# Patient Record
Sex: MALE | Race: WHITE | Employment: FULL TIME | URBAN - METROPOLITAN AREA
[De-identification: names, ages, dates, MRNs, and addresses within clinical notes are randomized per-mention and may not be internally consistent; named-entity substitution may affect disease eponyms.]

---

## 2018-09-11 ENCOUNTER — HOSPITAL ENCOUNTER (EMERGENCY)
Age: 43
Discharge: HOME | End: 2018-09-11
Attending: EMERGENCY MEDICINE
Payer: COMMERCIAL

## 2018-09-11 ENCOUNTER — APPOINTMENT (OUTPATIENT)
Dept: GENERAL RADIOLOGY | Age: 43
End: 2018-09-11
Payer: COMMERCIAL

## 2018-09-11 VITALS
RESPIRATION RATE: 18 BRPM | DIASTOLIC BLOOD PRESSURE: 73 MMHG | SYSTOLIC BLOOD PRESSURE: 122 MMHG | WEIGHT: 284 LBS | HEIGHT: 73 IN | HEART RATE: 75 BPM | BODY MASS INDEX: 37.64 KG/M2 | OXYGEN SATURATION: 93 % | TEMPERATURE: 98.5 F

## 2018-09-11 DIAGNOSIS — R19.7 DIARRHEA, UNSPECIFIED TYPE: ICD-10-CM

## 2018-09-11 DIAGNOSIS — K52.9 GASTROENTERITIS: Primary | ICD-10-CM

## 2018-09-11 LAB
ABSOLUTE EOS #: 0.2 K/UL (ref 0–0.4)
ABSOLUTE IMMATURE GRANULOCYTE: ABNORMAL K/UL (ref 0–0.3)
ABSOLUTE LYMPH #: 4.5 K/UL (ref 1–4.8)
ABSOLUTE MONO #: 1.1 K/UL (ref 0.2–0.8)
ALBUMIN SERPL-MCNC: 4.3 G/DL (ref 3.5–5.2)
ALBUMIN/GLOBULIN RATIO: NORMAL (ref 1–2.5)
ALP BLD-CCNC: 52 U/L (ref 40–129)
ALT SERPL-CCNC: 19 U/L (ref 5–41)
AMYLASE: 56 U/L (ref 28–100)
ANION GAP SERPL CALCULATED.3IONS-SCNC: 14 MMOL/L (ref 9–17)
AST SERPL-CCNC: 18 U/L
BASOPHILS # BLD: 1 % (ref 0–2)
BASOPHILS ABSOLUTE: 0.1 K/UL (ref 0–0.2)
BILIRUB SERPL-MCNC: 0.35 MG/DL (ref 0.3–1.2)
BILIRUBIN DIRECT: 0.1 MG/DL
BILIRUBIN, INDIRECT: 0.25 MG/DL (ref 0–1)
BUN BLDV-MCNC: 10 MG/DL (ref 6–20)
BUN/CREAT BLD: 15 (ref 9–20)
CALCIUM SERPL-MCNC: 9.3 MG/DL (ref 8.6–10.4)
CHLORIDE BLD-SCNC: 103 MMOL/L (ref 98–107)
CO2: 24 MMOL/L (ref 20–31)
CREAT SERPL-MCNC: 0.68 MG/DL (ref 0.7–1.2)
DIFFERENTIAL TYPE: ABNORMAL
EOSINOPHILS RELATIVE PERCENT: 1 % (ref 1–4)
GFR AFRICAN AMERICAN: >60 ML/MIN
GFR NON-AFRICAN AMERICAN: >60 ML/MIN
GFR SERPL CREATININE-BSD FRML MDRD: ABNORMAL ML/MIN/{1.73_M2}
GFR SERPL CREATININE-BSD FRML MDRD: ABNORMAL ML/MIN/{1.73_M2}
GLOBULIN: NORMAL G/DL (ref 1.5–3.8)
GLUCOSE BLD-MCNC: 98 MG/DL (ref 70–99)
HCT VFR BLD CALC: 48.8 % (ref 41–53)
HEMOGLOBIN: 16.4 G/DL (ref 13.5–17.5)
IMMATURE GRANULOCYTES: ABNORMAL %
LIPASE: 29 U/L (ref 13–60)
LYMPHOCYTES # BLD: 32 % (ref 24–44)
MCH RBC QN AUTO: 31.7 PG (ref 26–34)
MCHC RBC AUTO-ENTMCNC: 33.6 G/DL (ref 31–37)
MCV RBC AUTO: 94.3 FL (ref 80–100)
MONOCYTES # BLD: 8 % (ref 1–7)
NRBC AUTOMATED: ABNORMAL PER 100 WBC
PDW BLD-RTO: 14 % (ref 11.5–14.5)
PLATELET # BLD: 296 K/UL (ref 130–400)
PLATELET ESTIMATE: ABNORMAL
PMV BLD AUTO: 8.5 FL (ref 6–12)
POTASSIUM SERPL-SCNC: 4 MMOL/L (ref 3.7–5.3)
RBC # BLD: 5.18 M/UL (ref 4.5–5.9)
RBC # BLD: ABNORMAL 10*6/UL
SEG NEUTROPHILS: 58 % (ref 36–66)
SEGMENTED NEUTROPHILS ABSOLUTE COUNT: 8.3 K/UL (ref 1.8–7.7)
SODIUM BLD-SCNC: 141 MMOL/L (ref 135–144)
TOTAL PROTEIN: 7.2 G/DL (ref 6.4–8.3)
WBC # BLD: 14.1 K/UL (ref 3.5–11)
WBC # BLD: ABNORMAL 10*3/UL

## 2018-09-11 PROCEDURE — 82272 OCCULT BLD FECES 1-3 TESTS: CPT

## 2018-09-11 PROCEDURE — 87425 ROTAVIRUS AG IA: CPT

## 2018-09-11 PROCEDURE — 6360000002 HC RX W HCPCS: Performed by: EMERGENCY MEDICINE

## 2018-09-11 PROCEDURE — 96375 TX/PRO/DX INJ NEW DRUG ADDON: CPT

## 2018-09-11 PROCEDURE — 87329 GIARDIA AG IA: CPT

## 2018-09-11 PROCEDURE — 82150 ASSAY OF AMYLASE: CPT

## 2018-09-11 PROCEDURE — 85025 COMPLETE CBC W/AUTO DIFF WBC: CPT

## 2018-09-11 PROCEDURE — 87493 C DIFF AMPLIFIED PROBE: CPT

## 2018-09-11 PROCEDURE — 74022 RADEX COMPL AQT ABD SERIES: CPT

## 2018-09-11 PROCEDURE — 80048 BASIC METABOLIC PNL TOTAL CA: CPT

## 2018-09-11 PROCEDURE — 96374 THER/PROPH/DIAG INJ IV PUSH: CPT

## 2018-09-11 PROCEDURE — C9113 INJ PANTOPRAZOLE SODIUM, VIA: HCPCS | Performed by: EMERGENCY MEDICINE

## 2018-09-11 PROCEDURE — 83690 ASSAY OF LIPASE: CPT

## 2018-09-11 PROCEDURE — 80076 HEPATIC FUNCTION PANEL: CPT

## 2018-09-11 PROCEDURE — 36415 COLL VENOUS BLD VENIPUNCTURE: CPT

## 2018-09-11 PROCEDURE — 99284 EMERGENCY DEPT VISIT MOD MDM: CPT

## 2018-09-11 PROCEDURE — 2580000003 HC RX 258: Performed by: EMERGENCY MEDICINE

## 2018-09-11 PROCEDURE — 87505 NFCT AGENT DETECTION GI: CPT

## 2018-09-11 RX ORDER — 0.9 % SODIUM CHLORIDE 0.9 %
10 VIAL (ML) INJECTION ONCE
Status: COMPLETED | OUTPATIENT
Start: 2018-09-11 | End: 2018-09-11

## 2018-09-11 RX ORDER — ONDANSETRON 4 MG/1
4 TABLET, ORALLY DISINTEGRATING ORAL EVERY 8 HOURS PRN
Qty: 20 TABLET | Refills: 0 | Status: SHIPPED | OUTPATIENT
Start: 2018-09-11 | End: 2018-10-30

## 2018-09-11 RX ORDER — 0.9 % SODIUM CHLORIDE 0.9 %
1000 INTRAVENOUS SOLUTION INTRAVENOUS ONCE
Status: COMPLETED | OUTPATIENT
Start: 2018-09-11 | End: 2018-09-11

## 2018-09-11 RX ORDER — PANTOPRAZOLE SODIUM 40 MG/10ML
40 INJECTION, POWDER, LYOPHILIZED, FOR SOLUTION INTRAVENOUS ONCE
Status: COMPLETED | OUTPATIENT
Start: 2018-09-11 | End: 2018-09-11

## 2018-09-11 RX ORDER — ONDANSETRON 2 MG/ML
4 INJECTION INTRAMUSCULAR; INTRAVENOUS ONCE
Status: COMPLETED | OUTPATIENT
Start: 2018-09-11 | End: 2018-09-11

## 2018-09-11 RX ORDER — DIPHENOXYLATE HYDROCHLORIDE AND ATROPINE SULFATE 2.5; .025 MG/1; MG/1
1 TABLET ORAL 4 TIMES DAILY PRN
Qty: 12 TABLET | Refills: 0 | Status: SHIPPED | OUTPATIENT
Start: 2018-09-11 | End: 2018-09-16

## 2018-09-11 RX ORDER — DICYCLOMINE HYDROCHLORIDE 10 MG/1
10 CAPSULE ORAL EVERY 6 HOURS PRN
Qty: 20 CAPSULE | Refills: 0 | Status: SHIPPED | OUTPATIENT
Start: 2018-09-11 | End: 2018-10-30

## 2018-09-11 RX ADMIN — SODIUM CHLORIDE 1000 ML: 9 INJECTION, SOLUTION INTRAVENOUS at 20:37

## 2018-09-11 RX ADMIN — ONDANSETRON 4 MG: 2 INJECTION INTRAMUSCULAR; INTRAVENOUS at 20:37

## 2018-09-11 RX ADMIN — PANTOPRAZOLE SODIUM 40 MG: 40 INJECTION, POWDER, FOR SOLUTION INTRAVENOUS at 20:38

## 2018-09-11 RX ADMIN — Medication 10 ML: at 20:38

## 2018-09-11 ASSESSMENT — PAIN DESCRIPTION - FREQUENCY: FREQUENCY: INTERMITTENT

## 2018-09-11 ASSESSMENT — ENCOUNTER SYMPTOMS
ABDOMINAL DISTENTION: 1
DIARRHEA: 1
NAUSEA: 1
BLOOD IN STOOL: 0
ABDOMINAL PAIN: 1
RESPIRATORY NEGATIVE: 1

## 2018-09-11 ASSESSMENT — PAIN SCALES - GENERAL: PAINLEVEL_OUTOF10: 10

## 2018-09-11 ASSESSMENT — PAIN DESCRIPTION - LOCATION: LOCATION: ABDOMEN

## 2018-09-11 ASSESSMENT — PAIN DESCRIPTION - PROGRESSION: CLINICAL_PROGRESSION: NOT CHANGED

## 2018-09-11 ASSESSMENT — PAIN DESCRIPTION - PAIN TYPE: TYPE: ACUTE PAIN

## 2018-09-11 ASSESSMENT — PAIN DESCRIPTION - DESCRIPTORS: DESCRIPTORS: SHARP;CRAMPING

## 2018-09-12 LAB
C DIFFICILE TOXINS, PCR: ABNORMAL
CAMPYLOBACTER PCR: NORMAL
DIRECT EXAM: NORMAL
Lab: NORMAL
Lab: NORMAL
SALMONELLA PCR: NORMAL
SHIGATOXIN GENE PCR: NORMAL
SHIGELLA SP PCR: NORMAL
SPECIMEN DESCRIPTION: ABNORMAL
SPECIMEN DESCRIPTION: NORMAL
SPECIMEN DESCRIPTION: NORMAL
SPECIMEN: NORMAL
STATUS: NORMAL
STATUS: NORMAL

## 2018-09-12 NOTE — ED PROVIDER NOTES
[address] [place] [address] ED  eMERGENCY dEPARTMENT eNCOUnter      Pt Name: Blake Dutta  MRN: [id number]  [place] 1975  Date of evaluation: 9/11/2018  Provider: Meir Javier MD    CHIEF COMPLAINT       Chief Complaint   Patient presents with    Abdominal Pain     onset sunday    Diarrhea       HISTORY OF PRESENT ILLNESS  (Location/Symptom, Timing/Onset, Context/Setting, Quality, Duration, Modifying Factors, Severity.)   Blake Dutta is a 44 y.o. male who presents to the emergency department For evaluation of abdominal pain associated with nausea and diarrhea. Patient states his symptoms began about 2 days ago. He feels the symptoms may be related to eating [employer]. He has had paroxysmal episodes of yellow watery diarrhea. He denies melena hematemesis or coffee-ground emesis. He did miss work last night and tonight due to his symptoms. He has a diffuse crampy abdominal discomfort. Nursing Notes were reviewed. ALLERGIES     Pcn [penicillins]    CURRENT MEDICATIONS       Previous Medications    No medications on file       PAST MEDICAL HISTORY         Diagnosis Date    Kidney stone    DM  Obesity  Lymphocytosis    SURGICAL HISTORY           Procedure Laterality Date    HERNIA REPAIR      SHOULDER SURGERY           FAMILY HISTORY     History reviewed. No pertinent family history. No family status information on file. SOCIAL HISTORY      reports that he has been smoking. He has never used smokeless tobacco. He reports that he drinks alcohol. He reports that he does not use drugs. REVIEW OF SYSTEMS    (2-9 systems for level 4, 10 or more for level 5)     Review of Systems   HENT: Negative. Respiratory: Negative. Cardiovascular: Negative. Gastrointestinal: Positive for abdominal distention, abdominal pain, diarrhea and nausea. Negative for blood in stool. Genitourinary: Negative. Musculoskeletal: Negative. Skin: Negative.     Neurological: Positive for weakness. Hematological: Negative. Except as noted above the remainder of the review of systems was reviewed and negative. PHYSICAL EXAM    (up to 7 for level 4, 8 or more for level 5)     Vitals:    09/11/18 1936   BP: 122/73   Pulse: 75   Resp: 18   Temp: 98.5 °F (36.9 °C)   TempSrc: Oral   SpO2: 93%   Weight: 284 lb (128.8 kg)   Height: 6' 1\" (1.854 m)       Physical exam reflects a moderately obese male. He is afebrile with stable vital signs to include pulse ox of 93% on room air. He is not hypoxic. He is alert conversive and appropriate in behavior. He is not in distress. Oral pharyngeal exam is without lesion. Heart regular rate and rhythm normal S1-S2 no murmurs rubs gallops. Lungs are clear to auscultation without wheezes rales or rhonchi. Abdomen is distended. No focal pain rebound or guarding is a diffuse crampy discomfort. Midline incision noted consistent with his previous umbilical herniorrhaphy. He has slight tenderness in this area. Bowel sounds are appreciated. Extremities show no gross abnormality. Integument without rash or lesion. No neurovascular deficits are noted.       DIAGNOSTIC RESULTS         RADIOLOGY:   Non-plain film images such as CT, Ultrasound and MRI are read by the radiologist. Annalise Dooley radiographic images are visualized and preliminarily interpreted by the emergency physician with the below findings:    XR Acute Abd Series Chest 1 VW   Status: Final result   Order Providers     Authorizing Billing   MD Thanh Keating MD          Signed by     Signed Date/Time  Phone Pager   [address] [place] [address] 9/11/2018 21:32 [phone]    [facility]     Read Date Phone Pager   [address] [place] [address] Sep 11, 2018 [phone]    Radiation Dose Estimates     No radiation information found for this patient   Narrative   EXAMINATION:   TWO XRAY VIEWS OF THE ABDOMEN AND SINGLE  XRAY VIEW OF THE CHEST       9/11/2018 8:14 pm       COMPARISON:   None.     limits   CULTURE STOOL   C DIFF TOXIN B BY RT PCR   GIARDIA ANTIGEN   AMYLASE   HEPATIC FUNCTION PANEL   LIPASE   BLOOD OCCULT STOOL SCREEN #1   ROTAVIRUS ANTIGEN, STOOL     Results for Kasia Roman (MRN [id number]) as of 9/11/2018 21:00   Ref.  Range 9/11/2018 20:15 9/11/2018 20:37   Sodium Latest Ref Range: 135 - 144 mmol/L 141    Potassium Latest Ref Range: 3.7 - 5.3 mmol/L 4.0    Chloride Latest Ref Range: 98 - 107 mmol/L 103    CO2 Latest Ref Range: 20 - 31 mmol/L 24    BUN Latest Ref Range: 6 - 20 mg/dL 10    Creatinine Latest Ref Range: 0.70 - 1.20 mg/dL 0.68 (L)    Bun/Cre Ratio Latest Ref Range: 9 - 20  15    Anion Gap Latest Ref Range: 9 - 17 mmol/L 14    GFR Non- Latest Ref Range: >60 mL/min >60    GFR African American Latest Ref Range: >60 mL/min >60    Glucose Latest Ref Range: 70 - 99 mg/dL 98    Calcium Latest Ref Range: 8.6 - 10.4 mg/dL 9.3    Albumin/Globulin Ratio Latest Ref Range: 1.0 - 2.5  NOT REPORTED    Total Protein Latest Ref Range: 6.4 - 8.3 g/dL 7.2    GFR Comment Unknown Pend    GFR Staging Unknown NOT REPORTED    Albumin Latest Ref Range: 3.5 - 5.2 g/dL 4.3    Globulin Latest Ref Range: 1.5 - 3.8 g/dL NOT REPORTED    Alk Phos Latest Ref Range: 40 - 129 U/L 52    ALT Latest Ref Range: 5 - 41 U/L 19    Amylase Latest Ref Range: 28 - 100 U/L 56    AST Latest Ref Range: <40 U/L 18    Bilirubin Latest Ref Range: 0.3 - 1.2 mg/dL 0.35    Bilirubin, Direct Latest Ref Range: <0.31 mg/dL 0.10    Bilirubin, Indirect Latest Ref Range: 0.00 - 1.00 mg/dL 0.25    Lipase Latest Ref Range: 13 - 60 U/L 29    WBC Latest Ref Range: 3.5 - 11.0 k/uL 14.1 (H)    RBC Latest Ref Range: 4.5 - 5.9 m/uL 5.18    Hemoglobin Quant Latest Ref Range: 13.5 - 17.5 g/dL 16.4    Hematocrit Latest Ref Range: 41 - 53 % 48.8    MCV Latest Ref Range: 80 - 100 fL 94.3    MCH Latest Ref Range: 26 - 34 pg 31.7    MCHC Latest Ref Range: 31 - 37 g/dL 33.6    MPV Latest Ref Range: 6.0 - 12.0 fL 8.5    RDW Latest [id number]  [phone]    Schedule an appointment as soon as possible for a visit   If symptoms worsen    [facility] ED  [address] [facility]  [phone]    As needed, If symptoms worsen      DISCHARGE MEDICATIONS:     New Prescriptions    DICYCLOMINE (BENTYL) 10 MG CAPSULE    Take 1 capsule by mouth every 6 hours as needed (cramps)    DIPHENOXYLATE-ATROPINE (LOMOTIL) 2.5-0.025 MG PER TABLET    Take 1 tablet by mouth 4 times daily as needed for Diarrhea for up to 5 days. Meagher Rotunda     ONDANSETRON (ZOFRAN ODT) 4 MG DISINTEGRATING TABLET    Take 1 tablet by mouth every 8 hours as needed for Nausea           (Please note that portions of this note were completed with a voice recognition program.  Efforts were made to edit the dictations but occasionally words are mis-transcribed.)    Jose Luis Franco MD  Attending Emergency Physician         Jose Luis Franco MD  09/11/18 [id number]

## 2018-09-12 NOTE — ED NOTES
Pt presents to ED ambulatory with steady gait c/o of mid to upper abd pain and diarrhea since Sunday. Pt rates pain 10/10. Pt states eating [employer] and then having the pain. Pt denies n/v. Pt denies chest pain or SOB. Pt denies fever or chills. Bowel sounds active x4 quad. ABd is soft and non-tender to palpation. Skin is pink, warm, and dry.       Severa Current, RN  09/11/18 2043

## 2018-09-13 LAB
DATE, STOOL #1: NORMAL
DATE, STOOL #2: NORMAL
DATE, STOOL #3: NORMAL
HEMOCCULT SP1 STL QL: NEGATIVE
HEMOCCULT SP2 STL QL: NORMAL
HEMOCCULT SP3 STL QL: NORMAL
TIME, STOOL #1: NORMAL
TIME, STOOL #2: NORMAL
TIME, STOOL #3: NORMAL

## 2018-09-14 NOTE — ED NOTES
Rx for Flagyl 500mg TID #30 phoned to [address] [place] [address] Drug [phone]. Alon Martino [facility]  09/14/18 [id number]

## 2018-10-30 ENCOUNTER — OFFICE VISIT (OUTPATIENT)
Dept: FAMILY MEDICINE CLINIC | Age: 43
End: 2018-10-30
Payer: COMMERCIAL

## 2018-10-30 VITALS
TEMPERATURE: 97.9 F | WEIGHT: 287 LBS | SYSTOLIC BLOOD PRESSURE: 126 MMHG | OXYGEN SATURATION: 93 % | BODY MASS INDEX: 38.04 KG/M2 | HEIGHT: 73 IN | HEART RATE: 70 BPM | DIASTOLIC BLOOD PRESSURE: 86 MMHG

## 2018-10-30 DIAGNOSIS — Z23 NEED FOR DIPHTHERIA-TETANUS-PERTUSSIS (TDAP) VACCINE: ICD-10-CM

## 2018-10-30 DIAGNOSIS — F17.200 TOBACCO DEPENDENCE: ICD-10-CM

## 2018-10-30 DIAGNOSIS — Z82.49 FAMILY HISTORY OF EARLY CAD: ICD-10-CM

## 2018-10-30 DIAGNOSIS — Z99.89 OSA ON CPAP: ICD-10-CM

## 2018-10-30 DIAGNOSIS — G47.33 OSA ON CPAP: ICD-10-CM

## 2018-10-30 DIAGNOSIS — M50.20 CERVICAL HERNIATED DISC: Primary | ICD-10-CM

## 2018-10-30 DIAGNOSIS — R07.9 RIGHT-SIDED CHEST PAIN: ICD-10-CM

## 2018-10-30 DIAGNOSIS — M25.511 ACUTE PAIN OF RIGHT SHOULDER: ICD-10-CM

## 2018-10-30 DIAGNOSIS — M54.2 NECK PAIN: ICD-10-CM

## 2018-10-30 PROCEDURE — 99204 OFFICE O/P NEW MOD 45 MIN: CPT | Performed by: NURSE PRACTITIONER

## 2018-10-30 PROCEDURE — 93000 ELECTROCARDIOGRAM COMPLETE: CPT | Performed by: NURSE PRACTITIONER

## 2018-10-30 PROCEDURE — 90471 IMMUNIZATION ADMIN: CPT | Performed by: NURSE PRACTITIONER

## 2018-10-30 PROCEDURE — 90715 TDAP VACCINE 7 YRS/> IM: CPT | Performed by: NURSE PRACTITIONER

## 2018-10-30 RX ORDER — TIZANIDINE 4 MG/1
4 TABLET ORAL 3 TIMES DAILY
Qty: 30 TABLET | Refills: 0 | Status: SHIPPED | OUTPATIENT
Start: 2018-10-30 | End: 2018-11-09

## 2018-10-30 ASSESSMENT — ENCOUNTER SYMPTOMS
VOMITING: 0
VISUAL CHANGE: 0
PHOTOPHOBIA: 0
CHEST TIGHTNESS: 0
COUGH: 0
ABDOMINAL PAIN: 0
TROUBLE SWALLOWING: 0
COLOR CHANGE: 0
SHORTNESS OF BREATH: 0
WHEEZING: 0
NAUSEA: 0

## 2018-10-30 ASSESSMENT — PATIENT HEALTH QUESTIONNAIRE - PHQ9
1. LITTLE INTEREST OR PLEASURE IN DOING THINGS: 0
SUM OF ALL RESPONSES TO PHQ9 QUESTIONS 1 & 2: 0
SUM OF ALL RESPONSES TO PHQ QUESTIONS 1-9: 0
SUM OF ALL RESPONSES TO PHQ QUESTIONS 1-9: 0
2. FEELING DOWN, DEPRESSED OR HOPELESS: 0

## 2018-10-30 NOTE — PROGRESS NOTES
[address]  [place], [address]  [phone]      Divine Garcia is a 44 y.o. male who presents today for his  medicalconditions/complaints as noted below. Jasbir Langston is c/o of New Patient (declines flu vaccine) and Neck Pain (started a 1-1/2 months ago,worse since last Friday,ice made it worse,tried heat,icy hot helps for a couple hrs,unable to completely turn head on right,massage last wk)  . HPI:   Had prev. Right shoulder surgery and feels pain into right shoulder. He had PT ordered per prev. pcp and has appt this Thursday to trial. He also has routine blood work schd. For this saturday from labs ordered by dr Chaya Parham. Also has sleep apnea, dx'd > 5 years ago and needs new order for cpap machine and supplies, he has not been able to wear this for > 3 months d/t being broke. He did have MRi of neck and right shoulder in 2017 and was never sent to any specialists. He has strong fm hx of heart problems and concerned about right sided chest pain. Does have mild SOB at times. Neck Pain    This is a new problem. The current episode started more than 1 month ago (last 2 months). The problem occurs constantly. The problem has been gradually worsening. The pain is associated with nothing. The pain is present in the right side, left side and midline. The quality of the pain is described as aching, burning and shooting. The pain is moderate. The symptoms are aggravated by position and twisting. The pain is same all the time. Associated symptoms include chest pain (right side) and tingling. Pertinent negatives include no fever, headaches, leg pain, numbness, pain with swallowing, photophobia, syncope, trouble swallowing, visual change, weakness or weight loss. He has tried NSAIDs, ice, neck support, home exercises, heat, acetaminophen and bed rest for the symptoms. The treatment provided no relief.        Past Medical History:   Diagnosis Date will await for final rad report    Xray right shoulder reviewed today and bone chip noted in ac joint, unsure if r/t to old surgery, no acute changes   will await for final rad report    Ekg:  No acute change, possible LVH or pulmonary disease  Plan:      Return if symptoms worsen or fail to improve.   Orders Placed This Encounter   Procedures    XR CERVICAL SPINE (2-3 VIEWS)     Order Specific Question:   Reason for exam:     Answer:   pain    XR SHOULDER RIGHT (MIN 2 VIEWS)     Order Specific Question:   Reason for exam:     Answer:   pain, hx of surgery    Tdap (age 6y and older) IM ([address])   [address] [facility]- Fredrick Marroquin MD     Referral Priority:   Urgent     Referral Type:   Eval and Treat     Referral Reason:   Specialty Services Required     Referred to Provider:   Carmina Mandujano MD     Requested Specialty:   Pain Management     Number of Visits Requested:   1    (Gxt) Stress Test Exercise W Out Myoview     Standing Status:   Future     Standing Expiration Date:   10/30/2019     Order Specific Question:   Reason for Exam?     Answer:   EKG abnormalities    EKG 12 Lead     Order Specific Question:   Reason for Exam?     Answer:   Chest pain     Orders Placed This Encounter   Medications    tiZANidine (ZANAFLEX) 4 MG tablet     Sig: Take 1 tablet by mouth 3 times daily for 10 days     Dispense:  30 tablet     Refill:  0   neck/shoulder:  Continue with plan to trial formal PT  He has failed conservative tx ( nsaids, ice, heat, topicals, massage, percocet from prev. pcp)  F/u with pain mgmt for possible injections for herniated disk   Trial muscle relaxers    Pt to get our office copy of labs when complete  Hard rx given for new cpap and supplies      Cardio:  Check stress test with abnormal ekg, smoking hx and fm hx  Will call with test results  Stop smoking!!!    Pt not ready to quit smoking at this time and will change if he and when he is ready, all cessation options discussed    tdap updated, he declines other vaccines    * 45 mins spent with pt discussing mx health problems and counseling on tx options    Patient given educational materials - see patient instructions. Discussed use,benefit, and side effects of prescribed medications. All patient questions answered. Pt voiced understanding. Reviewed health maintenance. Instructed to continue currentmedications, diet and exercise.     Electronically signed by Kaycee Garcia CNP on 10/30/2018 at 12:54 PM

## 2018-11-05 ENCOUNTER — TELEPHONE (OUTPATIENT)
Dept: FAMILY MEDICINE CLINIC | Age: 43
End: 2018-11-05

## 2018-11-14 ENCOUNTER — TELEPHONE (OUTPATIENT)
Dept: FAMILY MEDICINE CLINIC | Age: 43
End: 2018-11-14

## 2018-11-15 ENCOUNTER — TELEPHONE (OUTPATIENT)
Dept: FAMILY MEDICINE CLINIC | Age: 43
End: 2018-11-15

## 2018-11-15 NOTE — TELEPHONE ENCOUNTER
I called patient and he stated he was at [facility] and told them the pressure was 11.

## 2019-07-23 ENCOUNTER — OFFICE VISIT (OUTPATIENT)
Dept: FAMILY MEDICINE CLINIC | Age: 44
End: 2019-07-23
Payer: COMMERCIAL

## 2019-07-23 VITALS
RESPIRATION RATE: 18 BRPM | HEART RATE: 75 BPM | TEMPERATURE: 98.6 F | WEIGHT: 294 LBS | DIASTOLIC BLOOD PRESSURE: 82 MMHG | BODY MASS INDEX: 38.79 KG/M2 | SYSTOLIC BLOOD PRESSURE: 138 MMHG | OXYGEN SATURATION: 94 %
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
## 2019-07-23 PROCEDURE — 99213 OFFICE O/P EST LOW 20 MIN: CPT | Performed by: NURSE PRACTITIONER

## 2019-07-23 RX ORDER — SULFAMETHOXAZOLE AND TRIMETHOPRIM 800; 160 MG/1; MG/1
2 TABLET ORAL 2 TIMES DAILY
Qty: 28 TABLET | Refills: 0 | Status: SHIPPED | OUTPATIENT
Start: 2019-07-23 | End: 2019-07-30

## 2019-07-23 RX ORDER — CEPHALEXIN 500 MG/1
500 CAPSULE ORAL 3 TIMES DAILY
Qty: 21 CAPSULE | Refills: 0 | Status: SHIPPED | OUTPATIENT
Start: 2019-07-23 | End: 2019-07-30

## 2019-07-23 ASSESSMENT — ENCOUNTER SYMPTOMS
BLOOD IN STOOL: 0
ANAL BLEEDING: 0
COUGH: 0
WHEEZING: 0
ABDOMINAL PAIN: 0
RECTAL PAIN: 0
SHORTNESS OF BREATH: 0
VOMITING: 0
NAUSEA: 0
CHEST TIGHTNESS: 0
RESPIRATORY NEGATIVE: 1
DIARRHEA: 1

## 2019-07-23 NOTE — PROGRESS NOTES
pain and vomiting. Musculoskeletal: Positive for arthralgias (left foot due to the area on the left leg). Skin: Positive for rash (bilateral legs). All other systems reviewed and are negative. Objective:      Physical Exam   Constitutional: He appears well-developed. No distress. HENT:   Head: Normocephalic and atraumatic. Mouth/Throat: Oropharynx is clear and moist.   Cardiovascular: Normal rate, regular rhythm and normal heart sounds. No murmur heard. Pulmonary/Chest: Effort normal and breath sounds normal. No respiratory distress. He has no wheezes. Abdominal: Soft. Bowel sounds are normal. He exhibits no distension. There is no tenderness. Neurological: He is alert. Skin: Skin is warm and dry. Rash (Left inner leg-- erythematous rash with a papules noted in the center. Smaller similar areas noted to the lateral right lower leg) noted. He is not diaphoretic. Nursing note and vitals reviewed. /82   Pulse 75   Temp 98.6 °F (37 °C) (Tympanic)   Resp 18   Wt 294 lb (133.4 kg)   SpO2 94%   BMI 38.79 kg/m²     Assessment:       Diagnosis Orders   1. Bug bite with infection, initial encounter  sulfamethoxazole-trimethoprim (BACTRIM DS) 800-160 MG per tablet    cephALEXin (KEFLEX) 500 MG capsule     Plan:      Area of redness on the left leg outlined in marker so that he can closely monitor. Patient instructed to complete antibiotic prescription fully. Tylenol/Motrin as needed for the discomfort/fever. Patient agreeable to treatment plan. Educational materials provided on AVS.  Follow up if symptoms do not improve/worsen.     Orders Placed This Encounter   Medications    sulfamethoxazole-trimethoprim (BACTRIM DS) 800-160 MG per tablet     Sig: Take 2 tablets by mouth 2 times daily for 7 days     Dispense:  28 tablet     Refill:  0    cephALEXin (KEFLEX) 500 MG capsule     Sig: Take 1 capsule by mouth 3 times daily for 7 days     Dispense:  21 capsule     Refill:  0

## 2019-07-23 NOTE — PATIENT INSTRUCTIONS

## 2020-05-22 ENCOUNTER — APPOINTMENT (OUTPATIENT)
Dept: CT IMAGING | Age: 45
End: 2020-05-22
Payer: COMMERCIAL

## 2020-05-22 ENCOUNTER — OFFICE VISIT (OUTPATIENT)
Dept: PRIMARY CARE CLINIC | Age: 45
End: 2020-05-22
Payer: COMMERCIAL

## 2020-05-22 ENCOUNTER — HOSPITAL ENCOUNTER (OUTPATIENT)
Age: 45
Setting detail: SPECIMEN
Discharge: HOME | End: 2020-05-22
Payer: COMMERCIAL

## 2020-05-22 ENCOUNTER — APPOINTMENT (OUTPATIENT)
Dept: GENERAL RADIOLOGY | Age: 45
End: 2020-05-22
Payer: COMMERCIAL

## 2020-05-22 ENCOUNTER — HOSPITAL ENCOUNTER (EMERGENCY)
Age: 45
Discharge: HOME | End: 2020-05-22
Attending: EMERGENCY MEDICINE
Payer: COMMERCIAL

## 2020-05-22 VITALS
WEIGHT: 300 LBS | OXYGEN SATURATION: 96 % | TEMPERATURE: 98.8 F | SYSTOLIC BLOOD PRESSURE: 116 MMHG | BODY MASS INDEX: 39.58 KG/M2 | HEART RATE: 83 BPM | DIASTOLIC BLOOD PRESSURE: 80 MMHG

## 2020-05-22 VITALS
BODY MASS INDEX: 39.76 KG/M2 | WEIGHT: 300 LBS | TEMPERATURE: 99.6 F | SYSTOLIC BLOOD PRESSURE: 136 MMHG | RESPIRATION RATE: 16 BRPM | DIASTOLIC BLOOD PRESSURE: 73 MMHG | HEIGHT: 73 IN | HEART RATE: 100 BPM | OXYGEN SATURATION: 94 %

## 2020-05-22 LAB
ABSOLUTE EOS #: 0 K/UL (ref 0–0.44)
ABSOLUTE IMMATURE GRANULOCYTE: 0.18 K/UL (ref 0–0.3)
ABSOLUTE LYMPH #: 2.58 K/UL (ref 1.1–3.7)
ABSOLUTE MONO #: 2.58 K/UL (ref 0.1–1.2)
ALBUMIN SERPL-MCNC: 4.4 G/DL (ref 3.5–5.2)
ALBUMIN/GLOBULIN RATIO: ABNORMAL (ref 1–2.5)
ALP BLD-CCNC: 70 U/L (ref 40–129)
ALT SERPL-CCNC: 33 U/L (ref 5–41)
ANION GAP SERPL CALCULATED.3IONS-SCNC: 15 MMOL/L (ref 9–17)
AST SERPL-CCNC: 31 U/L
BASOPHILS # BLD: 0 % (ref 0–2)
BASOPHILS ABSOLUTE: 0 K/UL (ref 0–0.2)
BILIRUB SERPL-MCNC: 1.12 MG/DL (ref 0.3–1.2)
BILIRUBIN DIRECT: 0.42 MG/DL
BILIRUBIN, INDIRECT: 0.7 MG/DL (ref 0–1)
BNP INTERPRETATION: NORMAL
BUN BLDV-MCNC: 7 MG/DL (ref 6–20)
BUN/CREAT BLD: 9 (ref 9–20)
CALCIUM SERPL-MCNC: 9.6 MG/DL (ref 8.6–10.4)
CHLORIDE BLD-SCNC: 97 MMOL/L (ref 98–107)
CO2: 23 MMOL/L (ref 20–31)
CREAT SERPL-MCNC: 0.76 MG/DL (ref 0.7–1.2)
DIFFERENTIAL TYPE: ABNORMAL
EOSINOPHILS RELATIVE PERCENT: 0 % (ref 1–4)
FERRITIN: 440 UG/L (ref 30–400)
GFR AFRICAN AMERICAN: >60 ML/MIN
GFR NON-AFRICAN AMERICAN: >60 ML/MIN
GFR SERPL CREATININE-BSD FRML MDRD: ABNORMAL ML/MIN/{1.73_M2}
GFR SERPL CREATININE-BSD FRML MDRD: ABNORMAL ML/MIN/{1.73_M2}
GLOBULIN: ABNORMAL G/DL (ref 1.5–3.8)
GLUCOSE BLD-MCNC: 127 MG/DL (ref 70–99)
HCT VFR BLD CALC: 49.4 % (ref 40.7–50.3)
HEMOGLOBIN: 16.6 G/DL (ref 13–17)
IMMATURE GRANULOCYTES: 1 %
LACTATE DEHYDROGENASE: 153 U/L (ref 135–225)
LYMPHOCYTES # BLD: 14 % (ref 24–43)
MCH RBC QN AUTO: 31.8 PG (ref 25.2–33.5)
MCHC RBC AUTO-ENTMCNC: 33.6 G/DL (ref 28.4–34.8)
MCV RBC AUTO: 94.6 FL (ref 82.6–102.9)
MONOCYTES # BLD: 14 % (ref 3–12)
MYOGLOBIN: <21 NG/ML (ref 28–72)
NRBC AUTOMATED: 0 PER 100 WBC
PDW BLD-RTO: 13.5 % (ref 11.8–14.4)
PLATELET # BLD: 265 K/UL (ref 138–453)
PLATELET ESTIMATE: ABNORMAL
PMV BLD AUTO: 10.4 FL (ref 8.1–13.5)
POTASSIUM SERPL-SCNC: 4 MMOL/L (ref 3.7–5.3)
PRO-BNP: 75 PG/ML
RBC # BLD: 5.22 M/UL (ref 4.21–5.77)
RBC # BLD: ABNORMAL 10*6/UL
SEG NEUTROPHILS: 71 % (ref 36–65)
SEGMENTED NEUTROPHILS ABSOLUTE COUNT: 13.06 K/UL (ref 1.5–8.1)
SODIUM BLD-SCNC: 135 MMOL/L (ref 135–144)
TOTAL PROTEIN: 8.2 G/DL (ref 6.4–8.3)
TROPONIN INTERP: ABNORMAL
TROPONIN INTERP: NORMAL
TROPONIN T: ABNORMAL NG/ML
TROPONIN T: NORMAL NG/ML
TROPONIN, HIGH SENSITIVITY: 6 NG/L (ref 0–22)
TROPONIN, HIGH SENSITIVITY: 7 NG/L (ref 0–22)
WBC # BLD: 18.4 K/UL (ref 3.5–11.3)
WBC # BLD: ABNORMAL 10*3/UL

## 2020-05-22 PROCEDURE — 83880 ASSAY OF NATRIURETIC PEPTIDE: CPT

## 2020-05-22 PROCEDURE — 6370000000 HC RX 637 (ALT 250 FOR IP): Performed by: NURSE PRACTITIONER

## 2020-05-22 PROCEDURE — 84484 ASSAY OF TROPONIN QUANT: CPT

## 2020-05-22 PROCEDURE — 80048 BASIC METABOLIC PNL TOTAL CA: CPT

## 2020-05-22 PROCEDURE — 2580000003 HC RX 258: Performed by: NURSE PRACTITIONER

## 2020-05-22 PROCEDURE — 80076 HEPATIC FUNCTION PANEL: CPT

## 2020-05-22 PROCEDURE — 71260 CT THORAX DX C+: CPT

## 2020-05-22 PROCEDURE — 85025 COMPLETE CBC W/AUTO DIFF WBC: CPT

## 2020-05-22 PROCEDURE — 71045 X-RAY EXAM CHEST 1 VIEW: CPT

## 2020-05-22 PROCEDURE — 82728 ASSAY OF FERRITIN: CPT

## 2020-05-22 PROCEDURE — 83615 LACTATE (LD) (LDH) ENZYME: CPT

## 2020-05-22 PROCEDURE — 93005 ELECTROCARDIOGRAM TRACING: CPT | Performed by: NURSE PRACTITIONER

## 2020-05-22 PROCEDURE — 99285 EMERGENCY DEPT VISIT HI MDM: CPT

## 2020-05-22 PROCEDURE — 6360000004 HC RX CONTRAST MEDICATION: Performed by: NURSE PRACTITIONER

## 2020-05-22 PROCEDURE — 83874 ASSAY OF MYOGLOBIN: CPT

## 2020-05-22 PROCEDURE — 99213 OFFICE O/P EST LOW 20 MIN: CPT | Performed by: NURSE PRACTITIONER

## 2020-05-22 RX ORDER — ACETAMINOPHEN 500 MG
1000 TABLET ORAL ONCE
Status: COMPLETED | OUTPATIENT
Start: 2020-05-22 | End: 2020-05-22

## 2020-05-22 RX ORDER — BENZONATATE 100 MG/1
100-200 CAPSULE ORAL 3 TIMES DAILY PRN
Qty: 60 CAPSULE | Refills: 0 | Status: SHIPPED | OUTPATIENT
Start: 2020-05-22 | End: 2020-05-29

## 2020-05-22 RX ORDER — SODIUM CHLORIDE 0.9 % (FLUSH) 0.9 %
10 SYRINGE (ML) INJECTION PRN
Status: DISCONTINUED | OUTPATIENT
Start: 2020-05-22 | End: 2020-05-22 | Stop reason: HOSPADM

## 2020-05-22 RX ORDER — AZITHROMYCIN 250 MG/1
TABLET, FILM COATED ORAL
Qty: 1 PACKET | Refills: 0 | Status: SHIPPED | OUTPATIENT
Start: 2020-05-22

## 2020-05-22 RX ORDER — 0.9 % SODIUM CHLORIDE 0.9 %
80 INTRAVENOUS SOLUTION INTRAVENOUS ONCE
Status: COMPLETED | OUTPATIENT
Start: 2020-05-22 | End: 2020-05-22

## 2020-05-22 RX ADMIN — ACETAMINOPHEN 1000 MG: 500 TABLET ORAL at 11:33

## 2020-05-22 RX ADMIN — IOPAMIDOL 75 ML: 755 INJECTION, SOLUTION INTRAVENOUS at 12:25

## 2020-05-22 RX ADMIN — SODIUM CHLORIDE 80 ML: 9 INJECTION, SOLUTION INTRAVENOUS at 12:26

## 2020-05-22 RX ADMIN — Medication 10 ML: at 12:26

## 2020-05-22 ASSESSMENT — ENCOUNTER SYMPTOMS
SHORTNESS OF BREATH: 0
COUGH: 1
NAUSEA: 0
ABDOMINAL PAIN: 0
SORE THROAT: 0
COUGH: 1
EYE PAIN: 0
RHINORRHEA: 0
EYE DISCHARGE: 0
SORE THROAT: 0
EYE ITCHING: 0
DIARRHEA: 0
SHORTNESS OF BREATH: 0
ALLERGIC/IMMUNOLOGIC NEGATIVE: 1
PHOTOPHOBIA: 0
ABDOMINAL PAIN: 0
DIARRHEA: 0
VOMITING: 0
NAUSEA: 0
SINUS PRESSURE: 0
EYES NEGATIVE: 1
CHEST TIGHTNESS: 0
VOMITING: 0
COLOR CHANGE: 0

## 2020-05-22 ASSESSMENT — PAIN SCALES - GENERAL: PAINLEVEL_OUTOF10: 10

## 2020-05-22 NOTE — PROGRESS NOTES
[address] [facility] [place] [address]  Dept: [phone]  Dept Fax: [phone]    Aranza Smith is a 41 y.o. male who presents today forhis medical conditions/complaints as noted below. Aranza Smith is c/o of   Chief Complaint   Patient presents with    Fever     99.6 this morning at ED     Shortness of Breath     dx with pneumonia at [facility] this morning, they advised pt to come for testing as they did not have testing capabilites- per pt     Headache     onset was yesterday        HPI:     Fever    This is a new problem. The current episode started today. The problem occurs constantly. The problem has been unchanged. The maximum temperature noted was 99 to 99.9 F. The temperature was taken using an oral thermometer. Associated symptoms include chest pain, congestion, coughing and headaches. Pertinent negatives include no abdominal pain, diarrhea, nausea, rash, sore throat, urinary pain or vomiting. Associated symptoms comments: SOB. Headache. . He has tried nothing for the symptoms. Was seen in [facility] ED today and DX with pneumonia  and was told to come here for Covid testing.         Past Medical History:   Diagnosis Date    Kidney stone     Kidney stones     Tobacco dependence 10/30/2018      Past Surgical History:   Procedure Laterality Date    HERNIA REPAIR      umbilical    LITHOTRIPSY      SHOULDER SURGERY      SHOULDER SURGERY      bone spur       Family History   Problem Relation Age of Onset    Cancer Mother         colon    Heart Attack Father          at age 52       Social History     Tobacco Use    Smoking status: Current Every Day Smoker     Packs/day: 1.00     Years: 30.00     Pack years: 30.00     Types: Cigarettes    Smokeless tobacco: Never Used   Substance Use Topics    Alcohol use: Yes     Comment:  nichol      Current Outpatient Medications   Medication Sig Dispense Refill    azithromycin (ZITHROMAX) 250 MG tablet Take two tablets on day 1, followed by one tablet on days 2-5. 1 packet 0    benzonatate (TESSALON) 100 MG capsule Take 1-2 capsules by mouth 3 times daily as needed for Cough 60 capsule 0     No current facility-administered medications for this visit. Allergies   Allergen Reactions    Influenza Vaccines     Pcn [Penicillins]            Subjective:      Review of Systems   Constitutional: Positive for fever. Negative for appetite change, chills and fatigue. HENT: Positive for congestion. Negative for postnasal drip and sore throat. Eyes: Negative. Negative for discharge and itching. Respiratory: Positive for cough. Negative for chest tightness and shortness of breath. Cardiovascular: Positive for chest pain. Negative for palpitations and leg swelling. Gastrointestinal: Negative for abdominal pain, diarrhea, nausea and vomiting. Endocrine: Negative. Genitourinary: Negative for dysuria, frequency and urgency. Musculoskeletal: Negative for neck pain and neck stiffness. Skin: Negative for rash. Allergic/Immunologic: Negative. Neurological: Positive for headaches. Negative for dizziness, weakness and light-headedness. Hematological: Negative for adenopathy. Psychiatric/Behavioral: Negative for confusion. Objective:      Physical Exam  Vitals signs reviewed. Constitutional:       Appearance: He is well-developed. HENT:      Head: Normocephalic. Right Ear: External ear normal.      Left Ear: External ear normal.      Nose: Nose normal.   Eyes:      Conjunctiva/sclera: Conjunctivae normal.      Pupils: Pupils are equal, round, and reactive to light. Neck:      Musculoskeletal: Normal range of motion and neck supple. Cardiovascular:      Rate and Rhythm: Normal rate and regular rhythm. Heart sounds: Normal heart sounds. No murmur. No friction rub. No gallop.     Pulmonary:      Effort: Pulmonary effort is normal. No respiratory [email or web address]    Prevention steps for People with confirmed or suspected COVID-19 (including persons under investigation) who do not need to be hospitalized  and   People with confirmed COVID-19 who were hospitalized and determined to be medically stable to go home    Your healthcare provider and public health staff will evaluate whether you can be cared for at home. If it is determined that you do not need to be hospitalized and can be isolated at home, you will be monitored by staff from your local or state health department. You should follow the prevention steps below until a healthcare provider or local or state health department says you can return to your normal activities. Stay home except to get medical care  People who are mildly ill with COVID-19 are able to isolate at home during their illness. You should restrict activities outside your home, except for getting medical care. Do not go to work, school, or public areas. Avoid using public transportation, ride-sharing, or taxis. Separate yourself from other people and animals in your home  People: As much as possible, you should stay in a specific room and away from other people in your home. Also, you should use a separate bathroom, if available. Animals: You should restrict contact with pets and other animals while you are sick with COVID-19, just like you would around other people. Although there have not been reports of pets or other animals becoming sick with COVID-19, it is still recommended that people sick with COVID-19 limit contact with animals until more information is known about the virus. When possible, have another member of your household care for your animals while you are sick. If you are sick with COVID-19, avoid contact with your pet, including petting, snuggling, being kissed or licked, and sharing food.  If you must care for your pet or be around animals while you are

## 2020-05-22 NOTE — PATIENT INSTRUCTIONS
Patient Education        Learning About Coronavirus [phone])  Coronavirus [phone]): Overview  What is coronavirus (KFAVL-89)? The coronavirus disease (COVID-19) is caused by a virus. It is an illness that was first found in [place], [place], in December 2019. It has since spread worldwide. The virus can cause fever, cough, and trouble breathing. In severe cases, it can cause pneumonia and make it hard to breathe without help. It can cause death. Coronaviruses are a large group of viruses. They cause the common cold. They also cause more serious illnesses like Middle East respiratory syndrome (MERS) and severe acute respiratory syndrome (SARS). COVID-19 is caused by a novel coronavirus. That means it's a new type that has not been seen in people before. This virus spreads person-to-person through droplets from coughing and sneezing. It can also spread when you are close to someone who is infected. And it can spread when you touch something that has the virus on it, such as a doorknob or a tabletop. What can you do to protect yourself from coronavirus (COVID-19)? The best way to protect yourself from getting sick is to:  · Avoid areas where there is an outbreak. · Avoid contact with people who may be infected. · Wash your hands often with soap or alcohol-based hand sanitizers. · Avoid crowds and try to stay at least 6 feet away from other people. · Wash your hands often, especially after you cough or sneeze. Use soap and water, and scrub for at least 20 seconds. If soap and water aren't available, use an alcohol-based hand . · Avoid touching your mouth, nose, and eyes. What can you do to avoid spreading the virus to others? To help avoid spreading the virus to others:  · Cover your mouth with a tissue when you cough or sneeze. Then throw the tissue in the trash. · Use a disinfectant to clean things that you touch often. · Wear a cloth face cover if you have to go to public areas.   · Stay home

## 2020-05-22 NOTE — ED PROVIDER NOTES
[facility] ED  eMERGENCY dEPARTMENT eNCOUnter      Pt Name: Anna Hurley  MRN: [id number]  [place] 1975  Date of evaluation: 2020  Provider: ARMANI Viera CNP    CHIEF COMPLAINT       Chief Complaint   Patient presents with    Shortness of Breath    Chest Pain    Headache         HISTORY OF PRESENT ILLNESS  (Location/Symptom, Timing/Onset, Context/Setting, Quality, Duration, Modifying Factors, Severity.)   Anna Hurley is a 41 y.o. male who presents to the emergency department via private auto for left chest pain. Onset was 2020. Reports he developed a headache, chills, cough yesterday. Denies N/V/D, SOB. Rates his pain 10/10 at this time. He took two aspirin yesterday afternoon. States his father passed from an MI at the age of 52. Nursing Notes were reviewed. ALLERGIES     Influenza vaccines and Pcn [penicillins]    CURRENT MEDICATIONS       Discharge Medication List as of 2020  1:12 PM          PAST MEDICAL HISTORY         Diagnosis Date    Kidney stone     Kidney stones     Tobacco dependence 10/30/2018       SURGICAL HISTORY           Procedure Laterality Date    HERNIA REPAIR      umbilical    LITHOTRIPSY      SHOULDER SURGERY      SHOULDER SURGERY      bone spur         FAMILY HISTORY           Problem Relation Age of Onset    Cancer Mother         colon    Heart Attack Father          at age 52     Family Status   Relation Name Status    Mother  Alive    Father          SOCIAL HISTORY      reports that he has been smoking cigarettes. He has a 30.00 pack-year smoking history. He has never used smokeless tobacco. He reports current alcohol use. He reports that he does not use drugs. REVIEW OF SYSTEMS    (2-9 systems for level 4, 10 or more for level 5)     Review of Systems   Constitutional: Positive for chills and fatigue. Negative for activity change, appetite change, diaphoresis and fever.    HENT: Negative for PE TECHNOLOGIST PROVIDED HISTORY: r/o PE Reason for Exam: CHEST PAIN Acuity: Acute Type of Exam: Initial History of tobacco dependence and morbid obesity. FINDINGS: Pulmonary Arteries: Pulmonary arteries are adequately opacified for evaluation, but breath hold and contrast related artifact limits assessment 3-6th order lower lobe pulmonary artery branches, and superior trunk right pulmonary artery. No large/central intraluminal filling defect to suggest major pulmonary embolism. Small nonocclusive emboli to the lower lobes and a subtle nonocclusive embolus in the apical branch superior trunk right pulmonary artery not excluded on this study. Main pulmonary artery is 34 mm. Mediastinum: No pathologically enlarged mediastinal lymph nodes; several prominent nodes noted, best seen right upper paratracheal (short axis 8 mm, slice 30, series 3). Similar-sized pre-vascular node (slice 53, series 3); subcarinal node with short axis 11 mm. The heart and pericardium demonstrate no acute abnormality. There is no acute abnormality of the thoracic aorta. Lungs/pleura: Focal consolidation anterior segment MONE with air bronchograms and mild peripheral GGO. No large effusion or pulmonary edema. No pneumothorax. Right lower lobe pulmonary nodule measuring 6 mm (best seen on slice 71, series 4). MONE ground-glass nodule measuring 5 mm (slice 35, series 4). Mild dependent/atelectatic changes posteroinferiorly, slightly greater on the left. Upper Abdomen: Limited images of the upper abdomen show probable hepatic steatosis but otherwise appear unremarkable. Soft Tissues/Bones: No acute bone or soft tissue abnormality. Mild DJD spine. No evidence of large/central pulmonary embolism; small nonocclusive lower lobe emboli or 2 truncus anterior RUL not excluded on this study. Left upper lobe anterior segment consolidation with air bronchograms and minimal peripheral GGO; bacterial pneumonia most likely consideration.   Viral and atypical causes should also be considered. Lung nodules, largest on the right measuring 6 mm. Left upper lobe ground-glass nodule may be related to the suspected infectious process. See recommendations below. Prominent but not pathologically enlarged mediastinal nodes, likely reactive. Hepatic steatosis. Additional incidental findings, as above. RECOMMENDATIONS: Fleischner Society guidelines for follow-up and management of incidentally detected pulmonary nodules: Single Solid Nodule: Nodule size less than 6 mm In a low-risk patient, no routine follow-up. In a high-risk patient, optional CT at 12 months. Nodule size equals 6-8 mm In a low-risk patient, CT at 6-12 months, then consider CT at 18-24 months. In a high-risk patient, CT at 6-12 months, then CT at 18-24 months. Multiple Solid Nodules: Nodule size less than 6 mm In a low-risk patient, no routine follow-up. In a high-risk patient, optional CT at 12 months. Nodule size equals 6-8 mm In a low-risk patient, CT at 3-6 months, then consider CT at 18-24 months. In a high-risk patient, CT at 3-6 months, then CT at 18-24 months. -Low risk patients include individuals with minimal or absent history of smoking and other known risk factors. - High risk patients include individuals with a history or smoking or known risk factors. Radiology 2017 http://pubs. rsna.org/doi/full/10.1148/radiol. 4428479716       LABS:  Labs Reviewed   BASIC METABOLIC PANEL - Abnormal; Notable for the following components:       Result Value    Glucose 127 (*)     Chloride 97 (*)     All other components within normal limits   CBC WITH AUTO DIFFERENTIAL - Abnormal; Notable for the following components:    WBC 18.4 (*)     Seg Neutrophils 71 (*)     Lymphocytes 14 (*)     Monocytes 14 (*)     Eosinophils % 0 (*)     Immature Granulocytes 1 (*)     Segs Absolute 13.06 (*)     Absolute Mono # 2.58 (*)     All other components within normal limits   HEPATIC FUNCTION PANEL - Abnormal; Notable

## 2020-05-22 NOTE — ED PROVIDER NOTES
In a low-risk patient, no routine follow-up. In a high-risk patient, optional CT at 12 months. Nodule size equals 6-8 mm   In a low-risk patient, CT at 6-12 months, then consider CT at 18-24 months. In a high-risk patient, CT at 6-12 months, then CT at 18-24 months. Multiple Solid Nodules:      Nodule size less than 6 mm   In a low-risk patient, no routine follow-up. In a high-risk patient, optional CT at 12 months. Nodule size equals 6-8 mm   In a low-risk patient, CT at 3-6 months, then consider CT at 18-24 months. In a high-risk patient, CT at 3-6 months, then CT at 18-24 months.      -Low risk patients include individuals with minimal or absent history of   smoking and other known risk factors. - High risk patients include individuals with a history or smoking or known   risk factors. Radiology 2017 http://pubs. rsna.org/doi/full/10.1148/radiol. 2574001402         XR CHEST PORTABLE   Preliminary Result   Left upper lung opacity possibly pneumonia. Follow-up after appropriate   therapy is advised to exclude neoplasm. LABS: All lab results were reviewed by myself, and all abnormals are listed below.   Labs Reviewed   BASIC METABOLIC PANEL - Abnormal; Notable for the following components:       Result Value    Glucose 127 (*)     Chloride 97 (*)     All other components within normal limits   CBC WITH AUTO DIFFERENTIAL - Abnormal; Notable for the following components:    WBC 18.4 (*)     Seg Neutrophils 71 (*)     Lymphocytes 14 (*)     Monocytes 14 (*)     Eosinophils % 0 (*)     Immature Granulocytes 1 (*)     Segs Absolute 13.06 (*)     Absolute Mono # 2.58 (*)     All other components within normal limits   HEPATIC FUNCTION PANEL - Abnormal; Notable for the following components:    Bilirubin, Direct 0.42 (*)     All other components within normal limits   TROP/MYOGLOBIN - Abnormal; Notable for the following components:    Myoglobin <21 (*)     All other components within normal limits   FERRITIN - Abnormal; Notable for the following components:    Ferritin 440 (*)     All other components within normal limits   BRAIN NATRIURETIC PEPTIDE   TROPONIN   LACTATE DEHYDROGENASE     CONSULTS:  None  FINAL IMPRESSION    No diagnosis found. PASTMEDICAL HISTORY     Past Medical History:   Diagnosis Date    Kidney stone     Kidney stones     Tobacco dependence 10/30/2018     SURGICAL HISTORY       Past Surgical History:   Procedure Laterality Date    HERNIA REPAIR      umbilical    LITHOTRIPSY      SHOULDER SURGERY      SHOULDER SURGERY      bone spur     CURRENT MEDICATIONS       Previous Medications    No medications on file     ALLERGIES     is allergic to influenza vaccines and pcn [penicillins]. FAMILY HISTORY     He indicated that his mother is alive. He indicated that his father is . SOCIAL HISTORY       Social History     Tobacco Use    Smoking status: Current Every Day Smoker     Packs/day: 1.00     Years: 30.00     Pack years: 30.00     Types: Cigarettes    Smokeless tobacco: Never Used   Substance Use Topics    Alcohol use: Yes     Comment:  [email or web address]    Drug use: No       I personally evaluated and examined the patient in conjunction with the APC and agree with the assessment, treatment plan, and disposition of the patient as recorded by the APC.    Delonte Guy MD  Attending Emergency Physician         Scottie Ruiz MD  [phone]

## 2020-05-22 NOTE — LETTER
[facility] ED  [address] [place] [address]  Phone: [phone]             May 22, 2020    Patient: Terri Chambers   YOB: 1975   Date of Visit: 5/22/2020       To Whom It May Concern:    Bryce Keating was seen and treated in our emergency department on 5/22/2020. Please excuse him from work 5/22/2020 through 6/5/2020.     Sincerely,             Signature:__________________________________

## 2020-05-23 ENCOUNTER — CARE COORDINATION (OUTPATIENT)
Dept: CARE COORDINATION | Age: 45
End: 2020-05-23

## 2020-05-23 LAB
EKG ATRIAL RATE: 92 BPM
EKG P AXIS: 44 DEGREES
EKG P-R INTERVAL: 164 MS
EKG Q-T INTERVAL: 344 MS
EKG QRS DURATION: 100 MS
EKG QTC CALCULATION (BAZETT): 425 MS
EKG R AXIS: 173 DEGREES
EKG T AXIS: 36 DEGREES
EKG VENTRICULAR RATE: 92 BPM

## 2020-05-23 PROCEDURE — 93010 ELECTROCARDIOGRAM REPORT: CPT | Performed by: INTERNAL MEDICINE

## 2020-05-23 NOTE — CARE COORDINATION
Attempt to contact patient today regarding ED follow up, COVID -19 Monitoring. Unable to reach patient. Left voicemail message      Recent encounters and notes reviewed. No future appointments.       Antonio Sawyer RN Care Manager  [phone]

## 2020-05-24 LAB — SARS-COV-2, NAA: NOT DETECTED

## 2020-05-26 ENCOUNTER — TELEPHONE (OUTPATIENT)
Dept: FAMILY MEDICINE CLINIC | Age: 45
End: 2020-05-26

## 2020-05-26 ENCOUNTER — CARE COORDINATION (OUTPATIENT)
Dept: CARE COORDINATION | Age: 45
End: 2020-05-26

## 2020-05-26 NOTE — TELEPHONE ENCOUNTER
----- Message from Yenifer Mohr RN sent at 5/26/2020  9:54 AM EDT -----  Regarding: Enroll in LOOP  Patient referred for LOOP Program: YES  Patient's preferred e-mail:  [email or web address]   Patient's preferred phone number:  [phone]   Care Plan:  Active Symptoms  LOOP Provider: [facility]  ([facility] ONLY) Recommended Follow Up: 3-5 days

## 2020-05-26 NOTE — CARE COORDINATION
Patient contacted regarding Marylene Sprinkles. Discussed COVID-19 related testing which was available at this time. Test results were negative. Patient informed of results, if available? Yes    Care Transition Nurse/ Ambulatory Care Manager contacted the patient by telephone to perform post discharge assessment. Verified name and  with patient as identifiers. Provided introduction to self, and explanation of the CTN/ACM role, and reason for call due to risk factors for infection and/or exposure to COVID-19. Symptoms reviewed with patient who verbalized the following symptoms: fever, cough, no new symptoms and no worsening symptoms. Due to no new or worsening symptoms encounter was not routed to provider for escalation. Patient has following risk factors of: no known risk factors. CTN/ACM reviewed discharge instructions, medical action plan and red flags such as increased shortness of breath, increasing fever and signs of decompensation with patient who verbalized understanding. Discussed exposure protocols and quarantine with CDC Guidelines What to do if you are sick with coronavirus disease .  Patient was given an opportunity for questions and concerns. The patient agrees to contact the [facility] exposure line [phone], [facility] department [place] [facility]: ([phone]) and PCP office for questions related to their healthcare. CTN/ACM provided contact information for future needs. Reviewed and educated patient on any new and changed medications related to discharge diagnosis   Patient referred for LOOP Program: YES  Patient's preferred e-mail:  [email or web address]   Patient's preferred phone number:  [phone]   Care Plan:  Active Symptoms  LOOP Provider: [facility]  ([facility] ONLY) Recommended Follow Up: 3-5 days      Patient/family/caregiver given information for GetWell Loop and agrees to enroll     Pt will be further monitored by COVID Loop Team based on severity of symptoms and risk factors.

## 2020-05-27 ENCOUNTER — TELEPHONE (OUTPATIENT)
Dept: PRIMARY CARE CLINIC | Age: 45
End: 2020-05-27